# Patient Record
Sex: FEMALE | Race: OTHER | HISPANIC OR LATINO | URBAN - METROPOLITAN AREA
[De-identification: names, ages, dates, MRNs, and addresses within clinical notes are randomized per-mention and may not be internally consistent; named-entity substitution may affect disease eponyms.]

---

## 2023-08-13 ENCOUNTER — EMERGENCY (EMERGENCY)
Facility: HOSPITAL | Age: 43
LOS: 1 days | Discharge: HOME | End: 2023-08-13
Payer: MEDICAID

## 2023-08-13 VITALS
SYSTOLIC BLOOD PRESSURE: 130 MMHG | RESPIRATION RATE: 16 BRPM | DIASTOLIC BLOOD PRESSURE: 76 MMHG | HEART RATE: 61 BPM | TEMPERATURE: 97 F | OXYGEN SATURATION: 95 %

## 2023-08-13 VITALS
WEIGHT: 147.49 LBS | DIASTOLIC BLOOD PRESSURE: 79 MMHG | SYSTOLIC BLOOD PRESSURE: 123 MMHG | RESPIRATION RATE: 16 BRPM | TEMPERATURE: 98 F | OXYGEN SATURATION: 97 % | HEIGHT: 63 IN | HEART RATE: 73 BPM

## 2023-08-13 PROCEDURE — 73630 X-RAY EXAM OF FOOT: CPT

## 2023-08-13 PROCEDURE — 96372 THER/PROPH/DIAG INJ SC/IM: CPT

## 2023-08-13 PROCEDURE — 99283 EMERGENCY DEPT VISIT LOW MDM: CPT | Mod: 25

## 2023-08-13 PROCEDURE — 99284 EMERGENCY DEPT VISIT MOD MDM: CPT

## 2023-08-13 PROCEDURE — 73630 X-RAY EXAM OF FOOT: CPT | Mod: 26,LT

## 2023-08-13 RX ORDER — KETOROLAC TROMETHAMINE 30 MG/ML
15 SYRINGE (ML) INJECTION ONCE
Refills: 0 | Status: DISCONTINUED | OUTPATIENT
Start: 2023-08-13 | End: 2023-08-13

## 2023-08-13 RX ORDER — OMEPRAZOLE 10 MG/1
1 CAPSULE, DELAYED RELEASE ORAL
Qty: 14 | Refills: 0
Start: 2023-08-13 | End: 2023-08-26

## 2023-08-13 RX ORDER — LIDOCAINE 4 G/100G
1 CREAM TOPICAL ONCE
Refills: 0 | Status: COMPLETED | OUTPATIENT
Start: 2023-08-13 | End: 2023-08-13

## 2023-08-13 RX ADMIN — LIDOCAINE 1 PATCH: 4 CREAM TOPICAL at 12:03

## 2023-08-13 RX ADMIN — Medication 15 MILLIGRAM(S): at 12:50

## 2023-08-13 RX ADMIN — Medication 15 MILLIGRAM(S): at 12:03

## 2023-08-13 NOTE — ED PROVIDER NOTE - CLINICAL SUMMARY MEDICAL DECISION MAKING FREE TEXT BOX
43 y/o female presents w/ acute, chronic, left heel pain w/ new onset of left knee pain. Will do X-ray of the foot to assess for heel spur. Left knee likely due to compensation when she walks. No signs of septic joint or DVT. Will treat pain w/ Toradol. 43 y/o female presents w/ acute, chronic, left heel pain w/ new onset of left knee pain. Will do X-ray of the foot to assess for heel spur. Left knee likely due to compensation when she walks. No signs of septic joint or DVT. Will treat pain w/ Toradol.    XR negative. Will dc with follow up with her specialist or with pod clinic.

## 2023-08-13 NOTE — ED ADULT TRIAGE NOTE - CHIEF COMPLAINT QUOTE
As per pt, c/o L heel pain radiating up to the L knee since last night, worsening this morning. LMP 08/2023. No other obvious traumas noted. Pt denies all other symptoms.

## 2023-08-13 NOTE — ED PROVIDER NOTE - NSFOLLOWUPINSTRUCTIONS_ED_ALL_ED_FT
Follow up with your specialist or the clinic.  If you experience any new or worsening symptoms or if you are concerned you can always come back to the emergency for a re-evaluation.  If there were any prescriptions given to you during the visit today take them as prescribed. If you have any questions you can ask the pharmacist.

## 2023-08-13 NOTE — ED ADULT TRIAGE NOTE - HEIGHT IN INCHES
Recheck if decreased level of consciousness.  Return if fever, unstable vital signs or worsen.  Follow-up with your primary care provider next week for confirmatory x-ray for foreign body passing.   3

## 2023-08-13 NOTE — ED PROVIDER NOTE - PHYSICAL EXAMINATION
Musculoskeletal: left leg w/o any discoloration, increased warmth, or open wounds; DP/PT pulses intact 2+; plantar heel tenderness; no foreign body, left knee full range of motion w/o swelling or tenderness, calf compartments soft and nontender

## 2023-08-13 NOTE — ED PROVIDER NOTE - NSFOLLOWUPCLINICS_GEN_ALL_ED_FT
[facility]  [facility]iatry/Wound [facility]  [address] [place] [address]  Phone: [phone]  Fax: [phone]

## 2023-08-13 NOTE — ED ADULT NURSE NOTE - PRIMARY CARE PROVIDER
Patient Satisfaction: Very pleased Price (Use Numbers Only, No Special Characters Or $): 0.00 Side Effects Or Complications: None Global Improvement: Good Comments (Free Text): 4 additional added at no charge per Mandy Treatment (Optional): Botox Detail Level: Simple UNK

## 2023-08-13 NOTE — ED PROVIDER NOTE - OBJECTIVE STATEMENT
Prefers for daughter to translate from Green[place]ic    41 y/o female presents w/ bilateral heel pain, worsening since last night. Reports heel pain is worse and new onset of left knee pain. Her job requires her to stand for 14 hours a day. Has been evaluated by specialists multiple times and was given insoles, injection in the foot, and therapy, but symptoms still persisted. No recent injury, twisting of the knee, fever, chills, or long-distance travel. Had prescription for Meloxicam from her specialist, but did not take it because it upsets her stomach. No known drug allergies.

## 2023-08-13 NOTE — ED PROVIDER NOTE - PATIENT PORTAL LINK FT
You can access the FollowMyHealth Patient Portal offered by [facility] by registering at the following website: [email or web address]. By joining [facility]’s FollowMyHealth portal, you will also be able to view your health information using other applications (apps) compatible with our system.

## 2024-11-25 NOTE — ED ADULT TRIAGE NOTE - NS ED TRIAGE AVPU SCALE
100
Alert-The patient is alert, awake and responds to voice. The patient is oriented to time, place, and person. The triage nurse is able to obtain subjective information.